# Patient Record
Sex: MALE | Race: WHITE | NOT HISPANIC OR LATINO | URBAN - METROPOLITAN AREA
[De-identification: names, ages, dates, MRNs, and addresses within clinical notes are randomized per-mention and may not be internally consistent; named-entity substitution may affect disease eponyms.]

---

## 2018-10-10 ENCOUNTER — APPOINTMENT (OUTPATIENT)
Dept: CT IMAGING | Facility: HOSPITAL | Age: 28
End: 2018-10-10

## 2018-10-10 ENCOUNTER — HOSPITAL ENCOUNTER (EMERGENCY)
Facility: HOSPITAL | Age: 28
Discharge: HOME | End: 2018-10-10
Attending: EMERGENCY MEDICINE | Admitting: EMERGENCY MEDICINE

## 2018-10-10 VITALS
SYSTOLIC BLOOD PRESSURE: 113 MMHG | TEMPERATURE: 98.5 F | DIASTOLIC BLOOD PRESSURE: 76 MMHG | RESPIRATION RATE: 16 BRPM | OXYGEN SATURATION: 99 % | BODY MASS INDEX: 20.81 KG/M2 | HEART RATE: 74 BPM | HEIGHT: 73 IN | WEIGHT: 157 LBS

## 2018-10-10 DIAGNOSIS — R10.13 EPIGASTRIC PAIN: Primary | ICD-10-CM

## 2018-10-10 LAB
ALBUMIN SERPL-MCNC: 4.8 G/DL (ref 3.5–5.2)
ALBUMIN/GLOB SERPL: 2 G/DL
ALP SERPL-CCNC: 98 U/L (ref 39–117)
ALT SERPL W P-5'-P-CCNC: 13 U/L (ref 1–41)
ANION GAP SERPL CALCULATED.3IONS-SCNC: 9 MMOL/L
AST SERPL-CCNC: 14 U/L (ref 1–40)
BASOPHILS # BLD AUTO: 0.02 10*3/MM3 (ref 0–0.2)
BASOPHILS NFR BLD AUTO: 0.5 % (ref 0–1.5)
BILIRUB SERPL-MCNC: 2.5 MG/DL (ref 0.1–1.2)
BILIRUB UR QL STRIP: NEGATIVE
BUN BLD-MCNC: 9 MG/DL (ref 6–20)
BUN/CREAT SERPL: 10 (ref 7–25)
CALCIUM SPEC-SCNC: 9.6 MG/DL (ref 8.6–10.5)
CHLORIDE SERPL-SCNC: 103 MMOL/L (ref 98–107)
CLARITY UR: CLEAR
CO2 SERPL-SCNC: 29 MMOL/L (ref 22–29)
COLOR UR: YELLOW
CREAT BLD-MCNC: 0.9 MG/DL (ref 0.76–1.27)
DEPRECATED RDW RBC AUTO: 41.5 FL (ref 37–54)
EOSINOPHIL # BLD AUTO: 0.03 10*3/MM3 (ref 0–0.7)
EOSINOPHIL NFR BLD AUTO: 0.8 % (ref 0.3–6.2)
ERYTHROCYTE [DISTWIDTH] IN BLOOD BY AUTOMATED COUNT: 13 % (ref 11.5–14.5)
GFR SERPL CREATININE-BSD FRML MDRD: 102 ML/MIN/1.73
GLOBULIN UR ELPH-MCNC: 2.4 GM/DL
GLUCOSE BLD-MCNC: 99 MG/DL (ref 65–99)
GLUCOSE UR STRIP-MCNC: NEGATIVE MG/DL
HCT VFR BLD AUTO: 46.7 % (ref 40.4–52.2)
HGB BLD-MCNC: 15.5 G/DL (ref 13.7–17.6)
HGB UR QL STRIP.AUTO: NEGATIVE
IMM GRANULOCYTES # BLD: 0 10*3/MM3 (ref 0–0.03)
IMM GRANULOCYTES NFR BLD: 0 % (ref 0–0.5)
KETONES UR QL STRIP: NEGATIVE
LEUKOCYTE ESTERASE UR QL STRIP.AUTO: NEGATIVE
LIPASE SERPL-CCNC: 31 U/L (ref 13–60)
LYMPHOCYTES # BLD AUTO: 1.14 10*3/MM3 (ref 0.9–4.8)
LYMPHOCYTES NFR BLD AUTO: 30.8 % (ref 19.6–45.3)
MCH RBC QN AUTO: 29.1 PG (ref 27–32.7)
MCHC RBC AUTO-ENTMCNC: 33.2 G/DL (ref 32.6–36.4)
MCV RBC AUTO: 87.6 FL (ref 79.8–96.2)
MONOCYTES # BLD AUTO: 0.33 10*3/MM3 (ref 0.2–1.2)
MONOCYTES NFR BLD AUTO: 8.9 % (ref 5–12)
NEUTROPHILS # BLD AUTO: 2.18 10*3/MM3 (ref 1.9–8.1)
NEUTROPHILS NFR BLD AUTO: 59 % (ref 42.7–76)
NITRITE UR QL STRIP: NEGATIVE
PH UR STRIP.AUTO: 7 [PH] (ref 5–8)
PLATELET # BLD AUTO: 158 10*3/MM3 (ref 140–500)
PMV BLD AUTO: 10.7 FL (ref 6–12)
POTASSIUM BLD-SCNC: 4.4 MMOL/L (ref 3.5–5.2)
PROT SERPL-MCNC: 7.2 G/DL (ref 6–8.5)
PROT UR QL STRIP: NEGATIVE
RBC # BLD AUTO: 5.33 10*6/MM3 (ref 4.6–6)
SODIUM BLD-SCNC: 141 MMOL/L (ref 136–145)
SP GR UR STRIP: 1.01 (ref 1–1.03)
UROBILINOGEN UR QL STRIP: NORMAL
WBC NRBC COR # BLD: 3.7 10*3/MM3 (ref 4.5–10.7)

## 2018-10-10 PROCEDURE — 36415 COLL VENOUS BLD VENIPUNCTURE: CPT

## 2018-10-10 PROCEDURE — 99283 EMERGENCY DEPT VISIT LOW MDM: CPT

## 2018-10-10 PROCEDURE — 83690 ASSAY OF LIPASE: CPT | Performed by: NURSE PRACTITIONER

## 2018-10-10 PROCEDURE — 80053 COMPREHEN METABOLIC PANEL: CPT | Performed by: NURSE PRACTITIONER

## 2018-10-10 PROCEDURE — 74177 CT ABD & PELVIS W/CONTRAST: CPT

## 2018-10-10 PROCEDURE — 25010000002 IOPAMIDOL 61 % SOLUTION: Performed by: EMERGENCY MEDICINE

## 2018-10-10 PROCEDURE — 81003 URINALYSIS AUTO W/O SCOPE: CPT | Performed by: NURSE PRACTITIONER

## 2018-10-10 PROCEDURE — 85025 COMPLETE CBC W/AUTO DIFF WBC: CPT | Performed by: NURSE PRACTITIONER

## 2018-10-10 RX ORDER — SUCRALFATE 1 G/1
1 TABLET ORAL 4 TIMES DAILY
Qty: 40 TABLET | Refills: 0 | Status: SHIPPED | OUTPATIENT
Start: 2018-10-10

## 2018-10-10 RX ORDER — ONDANSETRON 2 MG/ML
4 INJECTION INTRAMUSCULAR; INTRAVENOUS ONCE
Status: DISCONTINUED | OUTPATIENT
Start: 2018-10-10 | End: 2018-10-10 | Stop reason: HOSPADM

## 2018-10-10 RX ORDER — ONDANSETRON 4 MG/1
4 TABLET, FILM COATED ORAL EVERY 6 HOURS PRN
Qty: 14 TABLET | Refills: 0 | Status: SHIPPED | OUTPATIENT
Start: 2018-10-10

## 2018-10-10 RX ADMIN — IOPAMIDOL 85 ML: 612 INJECTION, SOLUTION INTRAVENOUS at 16:04

## 2018-10-10 NOTE — ED NOTES
States pmd recommended ed for possible ct scan, has scheduled endoscopy at the end of the month     Corie Henry, RN  10/10/18 3828

## 2018-10-10 NOTE — ED PROVIDER NOTES
Pt presents to ED complaining of intermittent epigastric pain for the past 3 months. Pt confirms nausea and lightheadedness upon episodes, and chills for the past 2 days. Per pt, pain is exacerbated by eating. Pt states he has scope scheduled for 10/30/18 - but presented to ED for worsening of pain.     On exam, pt has mild epigastric tenderness without rebound or guarding. Pt's heart is RRR and lungs CTAB.     I agree with midlevel plan to check labs and CT abd/pelvis for further evaluation.     1628: Negative CT Abd/Pelvis per Dr. Blue (radiologist).    The ANDRIY and I have discussed this patient's history, physical exam, and treatment plan.  I have reviewed the documentation and personally had a face to face interaction with the patient. I affirm the documentation and agree with the treatment and plan.  The attached note describes my personal findings.    Documentation assistance provided by jacobo Payne for Dr. Ochoa.  Information recorded by the scribe was done at my direction and has been verified and validated by me.            Elvira Payne  10/10/18 1628       Jacinto Ochoa MD  10/10/18 5116

## 2018-10-10 NOTE — ED PROVIDER NOTES
EMERGENCY DEPARTMENT ENCOUNTER    CHIEF COMPLAINT  Chief Complaint: abdominal pain  History given by:patient  History limited by:nothing  Time Seen: 1440  Room Number: 36/36  PMD: Provider, No Known      HPI:  Pt is a 28 y.o. male who presents with epigastric abdominal pain x 1 year but severe over the last 3 months. Pt states he has been seen by his PMD multiple times for this issue and has a endoscopy schedule for the end of the month.  Pt lives in [place] and is down visiting; states pain became severe and PMD told him to come to ER for CT scan.  Pt also with complaint of chronic nausea, lack of appetite and 15 lb wt loss over 3 months.  Pt denies vomiting, diarrhea, fever,chills, urinary concerns, or back pain.    Duration: x 1 year  Location:epigastric  Radiation:denies  Quality:pain  Intensity/Severity:severe  Progression:persistent  Associated Symptoms:nausea, lack of appetite, wt loss  Aggravating Factors:food  Alleviating Factors:denies  Previous Episodes:chronic  Treatment before arrival:protonix    PAST MEDICAL HISTORY  Active Ambulatory Problems     Diagnosis Date Noted   • No Active Ambulatory Problems     Resolved Ambulatory Problems     Diagnosis Date Noted   • No Resolved Ambulatory Problems     Past Medical History:   Diagnosis Date   • GERD (gastroesophageal reflux disease)        PAST SURGICAL HISTORY  History reviewed. No pertinent surgical history.    FAMILY HISTORY  History reviewed. No pertinent family history.    SOCIAL HISTORY  Social History     Social History   • Marital status: Single     Spouse name: N/A   • Number of children: N/A   • Years of education: N/A     Occupational History   • Not on file.     Social History Main Topics   • Smoking status: Never Smoker   • Smokeless tobacco: Not on file   • Alcohol use No   • Drug use: No   • Sexual activity: Not on file     Other Topics Concern   • Not on file     Social History Narrative   • No narrative on file         ALLERGIES  Patient  has no known allergies.    REVIEW OF SYSTEMS  Review of Systems   Constitutional: Positive for appetite change ( decreased) and unexpected weight change. Negative for activity change, chills and fever.   HENT: Negative for congestion, ear pain, rhinorrhea, sinus pressure and sore throat.    Eyes: Negative.    Respiratory: Negative.  Negative for cough and shortness of breath.    Cardiovascular: Negative.  Negative for chest pain, palpitations and leg swelling ( pedal).   Gastrointestinal: Positive for abdominal pain and nausea. Negative for diarrhea and vomiting.   Endocrine: Negative.    Genitourinary: Negative.  Negative for decreased urine volume, difficulty urinating, dysuria, frequency and urgency.   Musculoskeletal: Negative.  Negative for back pain.   Skin: Negative.  Negative for rash.   Allergic/Immunologic: Negative.    Neurological: Negative.  Negative for dizziness, weakness, light-headedness, numbness and headaches.   Hematological: Negative.    Psychiatric/Behavioral: Negative.  The patient is not nervous/anxious.    All other systems reviewed and are negative.      PHYSICAL EXAM  ED Triage Vitals   Temp Heart Rate Resp BP SpO2   10/10/18 1423 10/10/18 1423 10/10/18 1423 10/10/18 1428 10/10/18 1423   98.5 °F (36.9 °C) 96 18 121/92 100 %      Temp src Heart Rate Source Patient Position BP Location FiO2 (%)   10/10/18 1423 10/10/18 1423 -- -- --   Tympanic Monitor          Physical Exam   Constitutional: He is well-developed, well-nourished, and in no distress. No distress.   HENT:   Head: Normocephalic.   Mouth/Throat: Oropharynx is clear and moist and mucous membranes are normal.   Eyes: Pupils are equal, round, and reactive to light.   Neck: Normal range of motion.   Cardiovascular: Normal rate, regular rhythm and normal heart sounds.    Pulmonary/Chest: Effort normal and breath sounds normal. He has no wheezes.   Abdominal: Soft. Bowel sounds are normal. There is tenderness in the epigastric area.    Musculoskeletal: Normal range of motion. He exhibits no edema.   Neurological: He is alert.   Skin: Skin is warm and dry. No rash noted.   Psychiatric: Mood, memory, affect and judgment normal.   Nursing note and vitals reviewed.      LAB RESULTS  Recent Results (from the past 24 hour(s))   Comprehensive Metabolic Panel    Collection Time: 10/10/18  3:01 PM   Result Value Ref Range    Glucose 99 65 - 99 mg/dL    BUN 9 6 - 20 mg/dL    Creatinine 0.90 0.76 - 1.27 mg/dL    Sodium 141 136 - 145 mmol/L    Potassium 4.4 3.5 - 5.2 mmol/L    Chloride 103 98 - 107 mmol/L    CO2 29.0 22.0 - 29.0 mmol/L    Calcium 9.6 8.6 - 10.5 mg/dL    Total Protein 7.2 6.0 - 8.5 g/dL    Albumin 4.80 3.50 - 5.20 g/dL    ALT (SGPT) 13 1 - 41 U/L    AST (SGOT) 14 1 - 40 U/L    Alkaline Phosphatase 98 39 - 117 U/L    Total Bilirubin 2.5 (H) 0.1 - 1.2 mg/dL    eGFR Non African Amer 102 >60 mL/min/1.73    Globulin 2.4 gm/dL    A/G Ratio 2.0 g/dL    BUN/Creatinine Ratio 10.0 7.0 - 25.0    Anion Gap 9.0 mmol/L   Lipase    Collection Time: 10/10/18  3:01 PM   Result Value Ref Range    Lipase 31 13 - 60 U/L   CBC Auto Differential    Collection Time: 10/10/18  3:01 PM   Result Value Ref Range    WBC 3.70 (L) 4.50 - 10.70 10*3/mm3    RBC 5.33 4.60 - 6.00 10*6/mm3    Hemoglobin 15.5 13.7 - 17.6 g/dL    Hematocrit 46.7 40.4 - 52.2 %    MCV 87.6 79.8 - 96.2 fL    MCH 29.1 27.0 - 32.7 pg    MCHC 33.2 32.6 - 36.4 g/dL    RDW 13.0 11.5 - 14.5 %    RDW-SD 41.5 37.0 - 54.0 fl    MPV 10.7 6.0 - 12.0 fL    Platelets 158 140 - 500 10*3/mm3    Neutrophil % 59.0 42.7 - 76.0 %    Lymphocyte % 30.8 19.6 - 45.3 %    Monocyte % 8.9 5.0 - 12.0 %    Eosinophil % 0.8 0.3 - 6.2 %    Basophil % 0.5 0.0 - 1.5 %    Immature Grans % 0.0 0.0 - 0.5 %    Neutrophils, Absolute 2.18 1.90 - 8.10 10*3/mm3    Lymphocytes, Absolute 1.14 0.90 - 4.80 10*3/mm3    Monocytes, Absolute 0.33 0.20 - 1.20 10*3/mm3    Eosinophils, Absolute 0.03 0.00 - 0.70 10*3/mm3    Basophils, Absolute 0.02  0.00 - 0.20 10*3/mm3    Immature Grans, Absolute 0.00 0.00 - 0.03 10*3/mm3   Urinalysis With Microscopic If Indicated (No Culture) - Urine, Clean Catch    Collection Time: 10/10/18  3:38 PM   Result Value Ref Range    Color, UA Yellow Yellow, Straw    Appearance, UA Clear Clear    pH, UA 7.0 5.0 - 8.0    Specific Gravity, UA 1.014 1.005 - 1.030    Glucose, UA Negative Negative    Ketones, UA Negative Negative    Bilirubin, UA Negative Negative    Blood, UA Negative Negative    Protein, UA Negative Negative    Leuk Esterase, UA Negative Negative    Nitrite, UA Negative Negative    Urobilinogen, UA 0.2 E.U./dL 0.2 - 1.0 E.U./dL       I ordered the above labs and reviewed the results    RADIOLOGY  CT Abdomen Pelvis With Contrast   Final Result   Tiny cortical cyst in the right kidney. Otherwise   unremarkable CT scan of the abdomen and pelvis.       This report was finalized on 10/10/2018 4:27 PM by Dr. Sahil Blue M.D.              I ordered the above noted radiological studies and reviewed the images on the PACS system.      PROGRESS AND CONSULTS    Reviewed pt's history and workup with Dr. Ochoa.  After a bedside evaluation; Dr Ochoa agrees with the plan of care    1650-discussed test results and plan of care with patient.  Will discharge patient with prescriptions for symptomatic management; he will follow up with his primary care doctor and GI specialist upon returning home.  All questions answered and patient agrees with the plan    COURSE & MEDICAL DECISION MAKING  Pertinent Labs and Imaging studies that were ordered and reviewed are noted above.  Results were reviewed/discussed with the patient and they were also made aware of online assess.   Pt also made aware that some labs, such as cultures, will not be resulted during ER visit and follow up with PMD is necessary.     MEDICATIONS GIVEN IN ER  Medications   ondansetron (ZOFRAN) injection 4 mg (0 mg Intravenous Hold 10/10/18 9963)   iopamidol  "(ISOVUE-300) 61 % injection 100 mL (85 mL Intravenous Given 10/10/18 9138)       /76   Pulse 74   Temp 98.5 °F (36.9 °C) (Tympanic)   Resp 16   Ht 185.4 cm (73\")   Wt 71.2 kg (157 lb)   SpO2 99%   BMI 20.71 kg/m²     Discussed all results and noted any abnormalities with patient.  Discussed absoute need to recheck abnormalities with PMD    Reviewed implications of results, diagnosis, meds, responsibility to follow up, warning signs and symptoms of possible worsening, potential complications and reasons to return to ER with patient    Discussed plan for discharge, as there is no emergent indication for admission.  Pt is agreeable and understands need for follow up and repeat testing.  Pt is aware that discharge does not mean that nothing is wrong but it indicates no emergency is present and they must continue care with PMD.  Pt is discharged with instructions to follow up with primary care doctor to have their blood pressure rechecked.       DIAGNOSIS  Final diagnoses:   Epigastric pain       FOLLOW UP   with your GI specialist when you return home    Schedule an appointment as soon as possible for a visit         RX     Medication List      New Prescriptions    ondansetron 4 MG tablet  Commonly known as:  ZOFRAN  Take 1 tablet by mouth Every 6 (Six) Hours As Needed for Nausea.     sucralfate 1 g tablet  Commonly known as:  CARAFATE  Take 1 tablet by mouth 4 (Four) Times a Day.           Latha Jacob, APRN  10/10/18 1706    "